# Patient Record
Sex: FEMALE | Race: WHITE
[De-identification: names, ages, dates, MRNs, and addresses within clinical notes are randomized per-mention and may not be internally consistent; named-entity substitution may affect disease eponyms.]

---
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
## 2019-04-30 ENCOUNTER — HOSPITAL ENCOUNTER (EMERGENCY)
Dept: HOSPITAL 62 - ER | Age: 19
Discharge: LEFT BEFORE DISCHARGE | End: 2019-04-30
Payer: OTHER GOVERNMENT

## 2019-04-30 VITALS — DIASTOLIC BLOOD PRESSURE: 63 MMHG | SYSTOLIC BLOOD PRESSURE: 105 MMHG

## 2019-04-30 DIAGNOSIS — Z53.21: Primary | ICD-10-CM

## 2019-07-28 ENCOUNTER — HOSPITAL ENCOUNTER (EMERGENCY)
Dept: HOSPITAL 62 - ER | Age: 19
Discharge: HOME | End: 2019-07-28
Payer: MEDICAID

## 2019-07-28 VITALS — SYSTOLIC BLOOD PRESSURE: 108 MMHG | DIASTOLIC BLOOD PRESSURE: 60 MMHG

## 2019-07-28 DIAGNOSIS — G89.29: ICD-10-CM

## 2019-07-28 DIAGNOSIS — R42: ICD-10-CM

## 2019-07-28 DIAGNOSIS — R07.9: ICD-10-CM

## 2019-07-28 DIAGNOSIS — O26.92: Primary | ICD-10-CM

## 2019-07-28 DIAGNOSIS — Z3A.18: ICD-10-CM

## 2019-07-28 DIAGNOSIS — R06.02: ICD-10-CM

## 2019-07-28 LAB
ADD MANUAL DIFF: NO
ALBUMIN SERPL-MCNC: 3.9 G/DL (ref 3.7–5.6)
ALP SERPL-CCNC: 56 U/L (ref 50–135)
ALT SERPL-CCNC: 14 U/L (ref 5–35)
ANION GAP SERPL CALC-SCNC: 9 MMOL/L (ref 5–19)
APPEARANCE UR: CLEAR
APTT BLD: 28.5 SEC (ref 23.5–35.8)
APTT PPP: YELLOW S
AST SERPL-CCNC: 17 U/L (ref 5–30)
BASOPHILS # BLD AUTO: 0 10^3/UL (ref 0–0.2)
BASOPHILS NFR BLD AUTO: 0.3 % (ref 0–2)
BILIRUB DIRECT SERPL-MCNC: 0.2 MG/DL (ref 0–0.4)
BILIRUB SERPL-MCNC: 0.3 MG/DL (ref 0.2–1.3)
BILIRUB UR QL STRIP: NEGATIVE
BUN SERPL-MCNC: 10 MG/DL (ref 7–20)
CALCIUM: 9.1 MG/DL (ref 8.4–10.2)
CHLORIDE SERPL-SCNC: 104 MMOL/L (ref 98–107)
CO2 SERPL-SCNC: 24 MMOL/L (ref 22–30)
D DIMER PPP FEU-MCNC: 0.73 UG/ML (ref 0–0.5)
EOSINOPHIL # BLD AUTO: 0.2 10^3/UL (ref 0–0.6)
EOSINOPHIL NFR BLD AUTO: 2.1 % (ref 0–6)
ERYTHROCYTE [DISTWIDTH] IN BLOOD BY AUTOMATED COUNT: 14.9 % (ref 11.5–14)
GLUCOSE SERPL-MCNC: 86 MG/DL (ref 75–110)
GLUCOSE UR STRIP-MCNC: NEGATIVE MG/DL
HCT VFR BLD CALC: 32.2 % (ref 36–47)
HGB BLD-MCNC: 10.8 G/DL (ref 12–15.5)
INR PPP: 1.02
KETONES UR STRIP-MCNC: NEGATIVE MG/DL
LYMPHOCYTES # BLD AUTO: 2.1 10^3/UL (ref 0.5–4.7)
LYMPHOCYTES NFR BLD AUTO: 18.1 % (ref 13–45)
MCH RBC QN AUTO: 27.4 PG (ref 27–33.4)
MCHC RBC AUTO-ENTMCNC: 33.4 G/DL (ref 32–36)
MCV RBC AUTO: 82 FL (ref 80–97)
MONOCYTES # BLD AUTO: 0.5 10^3/UL (ref 0.1–1.4)
MONOCYTES NFR BLD AUTO: 4.5 % (ref 3–13)
NEUTROPHILS # BLD AUTO: 8.8 10^3/UL (ref 1.7–8.2)
NEUTS SEG NFR BLD AUTO: 75 % (ref 42–78)
NITRITE UR QL STRIP: NEGATIVE
PH UR STRIP: 5 [PH] (ref 5–9)
PLATELET # BLD: 317 10^3/UL (ref 150–450)
POTASSIUM SERPL-SCNC: 4.5 MMOL/L (ref 3.6–5)
PROT SERPL-MCNC: 6.7 G/DL (ref 6.3–8.2)
PROT UR STRIP-MCNC: NEGATIVE MG/DL
PROTHROMBIN TIME: 13.4 SEC (ref 11.4–15.4)
RBC # BLD AUTO: 3.92 10^6/UL (ref 3.72–5.28)
SP GR UR STRIP: 1.03
TOTAL CELLS COUNTED % (AUTO): 100 %
UROBILINOGEN UR-MCNC: NEGATIVE MG/DL (ref ?–2)
WBC # BLD AUTO: 11.7 10^3/UL (ref 4–10.5)

## 2019-07-28 PROCEDURE — 85379 FIBRIN DEGRADATION QUANT: CPT

## 2019-07-28 PROCEDURE — 81001 URINALYSIS AUTO W/SCOPE: CPT

## 2019-07-28 PROCEDURE — 85610 PROTHROMBIN TIME: CPT

## 2019-07-28 PROCEDURE — 84702 CHORIONIC GONADOTROPIN TEST: CPT

## 2019-07-28 PROCEDURE — 99284 EMERGENCY DEPT VISIT MOD MDM: CPT

## 2019-07-28 PROCEDURE — 85730 THROMBOPLASTIN TIME PARTIAL: CPT

## 2019-07-28 PROCEDURE — 85025 COMPLETE CBC W/AUTO DIFF WBC: CPT

## 2019-07-28 PROCEDURE — 36415 COLL VENOUS BLD VENIPUNCTURE: CPT

## 2019-07-28 PROCEDURE — 80053 COMPREHEN METABOLIC PANEL: CPT

## 2019-07-28 NOTE — ER DOCUMENT REPORT
ED General





- General


Mode of Arrival: Ambulatory


Information source: Patient


TRAVEL OUTSIDE OF THE [place] IN LAST 30 DAYS: No





- HPI


Onset: Other - Shortness of breath 3 months and chest pain since her child was 

born 8 months ago


Onset/Duration: Intermittent


Quality of pain: Sharp


Severity: Mild


Pain Level: 1


Associated symptoms: Shortness of breath


Exacerbated by: Denies


Relieved by: Denies


Similar symptoms previously: Yes


Recently seen / treated by doctor: Yes





<JANUARY BUTLER - Last Filed: 19 21:11>





<YURY BLISS - Last Filed: 19 21:34>





- General


Chief Complaint: Dizziness


Stated Complaint: DIZZY, TROUBLE BREATHING


Time Seen by Provider: 19 16:37


Notes: 





18-year-old female presented to ED for complaint of dizziness and shortness of 

breath.  She is 18 weeks pregnant with her second child she does have an 

8-month-old child at home.  She states she has been feeling like this short of 

breath and pain in the center of her chest that comes and goes for about the 

last 2 to 3 months.  He states she has had this chest pain intermittently since 

her son was born; who is now 8 months old also.  She states she is been seen by 

her doctor multiple times and they have told her that everything is normal they 

have done blood work and everything is been fine.  She does have another 

appointment on Wednesday and she was just seen last Wednesday.  She does have a 

history of borderline personality disorder anxiety depression bipolar PTSD and 

anorexia.  Patient is alert oriented respirations regular and unlabored lungs 

are clear to auscultation.  I did speak with [facility] and they said she 

does have a follow-up appointment this coming Wednesday. (JANUARY BUTLER)





- Related Data


Allergies/Adverse Reactions: 


                                        





No Known Allergies Allergy (Verified 19 14:50)


   











Past Medical History





- General


Information source: Patient





- Social History


Smoking Status: Former Smoker - Vapor cigarette


Chew tobacco use (# tins/day): No


Frequency of alcohol use: None


Drug Abuse: None


Lives with: Family


Family History: Reviewed & Not Pertinent


Patient has suicidal ideation: No


Patient has homicidal ideation: No





- Past Medical History


Cardiac Medical History: Reports: None


Pulmonary Medical History: Reports: None


EENT Medical History: Reports: None


Neurological Medical History: Reports: None


Endocrine Medical History: Reports: None


Renal/ Medical History: Reports: None


Malignancy Medical History: Reports: None


GI Medical History: Reports: None


Musculoskeletal Medical History: Reports Hx Musculoskeletal Trauma


Skin Medical History: Reports None


Psychiatric Medical History: Reports: Hx Anxiety, Hx Bipolar Disorder, Hx 

Borderline Personality Disorder, Hx Depression, Hx Post Traumatic Stress 

Disorder, Other - Anorexia


Traumatic Medical History: Reports: Hx Fractures - Left clavicle right foot tib-

fib and ankle


Infectious Medical History: Reports: None


Past Surgical History: Reports: Hx Orthopedic Surgery - Right foot and ankle





- Immunizations


Immunizations up to date: Yes





<JANUARY BUTLER - Last Filed: 19 21:11>





Review of Systems





- Review of Systems


Constitutional: No symptoms reported


EENT: No symptoms reported


Cardiovascular: Chest pain - Chest pain intermittently no chest pain at this 

time, Dizziness


Respiratory: Short of breath


Gastrointestinal: No symptoms reported


Genitourinary: No symptoms reported


Female Genitourinary: Pregnant, Other - Intermittent round ligament pain


Musculoskeletal: No symptoms reported


Skin: No symptoms reported


Hematologic/Lymphatic: No symptoms reported


Neurological/Psychological: No symptoms reported


-: Yes All other systems reviewed and negative





<JANUARY BUTLER - Last Filed: 19 21:11>





Physical Exam





- Vital signs


Interpretation: Normal





- General


General appearance: Appears well, Alert





- HEENT


Head: Normocephalic, Atraumatic


Eyes: Normal


Pupils: PERRL





- Respiratory


Respiratory status: No respiratory distress.  No: Respiratory distress


Chest status: Nontender.  No: Tender


Breath sounds: Normal.  No: Decreased air movement, Nonproductive cough, 

Productive cough


Chest palpation: Normal





- Cardiovascular


Rhythm: Regular


Heart sounds: Normal auscultation


Murmur: No





- Abdominal


Inspection: Normal, Gravid female - 18 weeks pregnant fetal heart turn 145


Distension: No distension


Bowel sounds: Normal


Tenderness: Nontender


Organomegaly: No organomegaly





- Back


Back: Normal, Nontender





- Extremities


General upper extremity: Normal inspection, Nontender, Normal color, Normal ROM,

Normal temperature


General lower extremity: Normal inspection, Nontender, Normal color, Normal ROM,

Normal temperature, Normal weight bearing.  No: Courtney's sign





- Neurological


Neuro grossly intact: Yes


Cognition: Normal


Orientation: AAOx4


Tanisha Coma Scale Eye Opening: Spontaneous


Mccleary Coma Scale Verbal: Oriented


Mccleary Coma Scale Motor: Obeys Commands


Mccleary Coma Scale Total: 15


Speech: Normal


Motor strength normal: LUE, RUE, LLE, RLE


Sensory: Normal





- Psychological


Associated symptoms: Normal affect, Normal mood





- Skin


Skin Temperature: Warm


Skin Moisture: Dry


Skin Color: Normal





<JANUARY BUTLER - Last Filed: 19 21:11>





- Vital signs


Vitals: 





                                        











Temp Pulse Resp BP Pulse Ox


 


 98.1 F   102 H  18   102/63   98 


 


 19 15:00  19 15:00  19 15:00  19 15:00  19 15:00














Course





- Laboratory


Result Diagrams: 


                                 19 17:00





                                 19 17:00





<JANUARY BUTLER - Last Filed: 19 21:11>





- Laboratory


Result Diagrams: 


                                 19 17:00





                                 19 17:00





<YURY BLISS - Last Filed: 19 21:34>





- Re-evaluation


Re-evalutation: 





19 20:52


Consulted Dr. Bliss concerning the positive d-dimer and a 18-week pregnant girl. 

She has a history of mental illnesses with shortness of breath and chest pain 

during her pregnancy last time and this time.  She has been to the OB/GYN 

multiple times for the same symptoms.  She was just concerned because she was 

going to be flying across the country.  Dr. Bliss did come in and examined the 

patient and stated that the patient could go home at this time.  He did an 

ultrasound at the bedside.  I am sending the patient home with her lab values to

follow-up with her OB/GYN.  I did speak with [facility] and they states she

does have an appointment on Wednesday.  Spoke with a Dr. Morris. 

(JANUARY BUTLER)





19 21:29


I personally and independently obtained patient history and examined the patient

in conjunction with the APC and agree with the assessment, treatment plan and 

disposition of the patient as recorded by the APC, and have reviewed the APC's 

note.





HISTORY OF PRESENT ILLNESS:


Patient is a 18-year-old female,  that presents to the emergency department

for chief complaint of shortness of breath and palpitations.  Patient reports 

she really only gets these symptoms when she stands up, she looked up online 

that may be she had pots, she did see her OB/GYN for this, and discussed that 

with them, initially thought it was due to anxiety, but she wanted to be checked

out further, she was referred to a cardiologist, but has not seen them yet.  She

denies having any chest pain at this time although she reports she is had chest 

pain before, but does not associated with the symptoms of shortness of breath 

and palpitations when she stands up.  She states that it only lasts for a moment

or 2 after standing up.  She denies any swelling in her legs, or calf pain or 

tenderness.  She is most concerned because she is recently set the travel and 

wants to have everything checked out.





ROS:


Constitutional: Negative for fever.


Cardiovascular: Positive for palpitations.


Respiratory: Positive for shortness of breath


Gastrointestinal: Negative for vomiting or abdominal pain 


Musculoskeletal: Negative for arm, leg or back pain


Skin: Negative for rash.


Neurological: Negative for weakness or numbness.





Other than noted above, the 12 point review of systems was reviewed with the 

patient and were negative, all pertinent findings are included in the HPI.








PHYSICAL EXAMINATION:





Vital signs reviewed, nursing noted reviewed. 





GENERAL: Well-appearing, well-nourished and in no acute distress.





HEAD: Atraumatic, normocephalic.





EYES: Eyes appear normal, conjunctiva are normal.





ENT: nares patent, oropharynx clear without exudates.  Moist mucous membranes.





NECK: Normal range of motion, supple without lymphadenopathy





LUNGS: Breath sounds clear to auscultation bilaterally and equal.  No wheezes 

rales or rhonchi.





HEART: Regular rate and rhythm without murmurs





ABDOMEN: Soft, nontender, normoactive bowel sounds.  No rebound, guarding, or 

rigidity. No masses appreciated.





EXTREMITIES: Nontender, good range of motion, no pitting or edema.  





NEUROLOGICAL: No focal neurological deficits. Moves all extremities 

spontaneously Motor and sensory grossly intact on exam.





PSYCH: Normal mood, normal affect.





SKIN: Warm, Dry, normal turgor, no rashes or lesions noted on exposed skin





MEDICAL DECISION MAKING:





Patient appeared well on exam, no acute distress, I was consulted, due to an 

elevated d-dimer, that was ordered in triage, I did not think that this test was

necessary in the first place unfortunately was positive, however it typically is

positive and pregnant patients, patient's clinical symptoms and history are not 

consistent with PE, and she certainly does not have symptoms of DVT, she seems 

to have orthostasis, causing lightheadedness, palpitations and shortness of 

breath, that is improved with sitting down and relaxing.  I did perform bedside 

echocardiogram, demonstrated a completely normal bedside echo, she had an EF of 

estimated 65%, normal sized right ventricle, concentric and normal LV function, 

no pericardial effusion, and no valvular dysfunction noted with color Doppler.





At this point I feel the patient can be discharged home, I have very low 

suspicion that the patient has any acute cardiopulmonary abnormalities leading 

to her symptoms, I feel she needs to follow-up with her OB/GYN, for any further 

issues, I discussed with her, that if she develops pleuritic pain, or unilateral

leg swelling or any symptoms concerning for DVT or PE that she needs to return 

to the emergency department, patient was agreeable with this plan of care and wa

s discharged home.





Please review detail APC documentation. 





*Note is created using voice recognition software and may contain spelling, 

syntax or grammatical errors.  


 (YURY BLISS)





- Vital Signs


Vital signs: 





                                        











Temp Pulse Resp BP Pulse Ox


 


 98.6 F   64   18   108/60   99 


 


 19 20:54  19 20:54  19 20:54  19 20:54  19 20:54














- Laboratory


Laboratory results interpreted by me: 





                                        











  19





  17:00 17:00 17:00


 


WBC  11.7 H  


 


Hgb  10.8 L  


 


Hct  32.2 L  


 


RDW  14.9 H  


 


Absolute Neutrophils  8.8 H  


 


D-Dimer    0.73 H


 


Sodium   136.9 L 


 


Beta HCG, Quant   83060.00 H 














Discharge





<JANUARY BUTLER - Last Filed: 19 21:11>





<YURY BLISS - Last Filed: 19 21:34>





- Discharge


Clinical Impression: 


 Shortness of breath due to pregnancy in second trimester, Chronic chest pain 

during pregnancy





Condition: Stable


Disposition: HOME, SELF-CARE


Additional Instructions: 


You were seen today for your chronic chest pain, dizziness, and shortness of 

breath during your pregnancy.  You state you have had the same pain in your 

chest intermittently since the birth of your son who is now 8 months old.  You 

state you have been seen at [facility] and have had work-ups and

were told you were normal.  You are taking your hydroxyzine as you have been 

instructed.  Please continue medications as were prescribed by your OB/GYN.








Dr. Bliss did see you in the emergency room and did a ultrasound at the bedside. 

As he stated you need to follow-up with OB/GYN on Wednesday as a scheduled.  

Please take your lab results with you to your visit at OB/GYN.








As we discussed please research vapor cigarettes and popcorn long before you 

continue smoking your vapor cigarette.





Acetaminophen





     Acetaminophen may be taken for pain relief or fever control. It's much 

safer than aspirin, offering a wider range of "safe" dosages.  It is safe during

pregnancy.  Some brand names are Tylenol, Panadol, Datril, Anacin 3, Tempra, and

Liquiprin. Acetaminophen can be repeated every four hours.  The following are 

maximum recommended dosages:





WEIGHT         Dose             Drops                  Elixir        

Chewable(80mg)


(LBS.)                            drprs=droppers    tsp=teaspoon


6                 40 mg            .4 ml (1/2)


6-11            80 mg            .8 ml (full)            1/2   tsp           1  

    tab


12-16         120 mg           1 1/2 drprs            3/4   tsp           1 1/2 

tabs


17-23         160 mg             2  drprs              1      tsp           2   

   tabs


24-30         240 mg             3  drprs              1 1/2 tsp           3    

  tabs


30-35         320 mg                                     2       tsp           4

      tabs


36-41         360 mg                                     2 1/4 tsp           4 

1/2  tabs


42-47         400 mg                                     2 1/2 tsp           5  

    tabs


48-53         480 mg                                     3       tsp          6 

     tabs


54-59         520 mg                                     3  1/4 tsp          6 

1/2 tabs


60-64         560 mg                                     3  1/2 tsp          7  

   tabs 


65-70         600 mg                                     3  3/4 tsp          7 

1/2 tabs


71-76         640 mg                                     4       tsp           8

     tabs


77-82         720 mg                                     4 1/2  tsp           9 

    tabs


83-88         800 mg                                     5       tsp           

10     tabs





>89 pounds or adults          650 mg to 900 mg 





Acetaminophen can be repeated every four hours. Maximum daily dose not to exceed

4000 mg.





   These maximum recommended dosages are slightly higher than the dosages 

written on the product container, but these dosages are very safe and well below

the toxic dosage for acetaminophen.








FOLLOW-UP CARE:


If you have been referred to a physician for follow-up care, call the 

physicians office for an appointment as you were instructed or within the next 

two days.  If you experience worsening or a significant change in your symptoms,

notify the physician immediately or return to the Emergency Department at any 

time for re-evaluation.

## 2019-07-28 NOTE — ER DOCUMENT REPORT
ED Medical Screen (RME)





- General


Chief Complaint: Dizziness


Stated Complaint: DIZZY, TROUBLE BREATHING


Time Seen by Provider: 07/28/19 16:37


Primary Care Provider: 


KWABENA MCDONALD MD [Primary Care Provider] - Follow up as needed


Notes: 





Patient is a G2, P1 18-year-old female, 18 weeks pregnant who presents the 

emergency department with a chief complaint of dizziness and shortness of 

breath.  She states that she feels like she has shortness of breath in the 

middle of her chest and states that she has a little bit of pain.  Patient 

states that she has been feeling dizzy for a while, ever since she had her 

daughter who is 8 months old.  She has been seen by [facility] and has had work-ups and was told that everything was normal. 

She was prescribed hydroxyzine, but she has not taken her hydroxyzine.  Past 

medical history includes bipolar disorder, PTSD, and depression.





Exam: Visibly pregnant, and about 18 weeks pregnant.





I have greeted and performed a rapid initial assessment of this patient.  A 

comprehensive ED assessment and evaluation of the patient, analysis of test 

results and completion of medical decision making process will be conducted by 

an additional ED providers.


TRAVEL OUTSIDE OF THE [place] IN LAST 30 DAYS: No





- Related Data


Allergies/Adverse Reactions: 


                                        





No Known Allergies Allergy (Verified 07/28/19 14:50)


   











Past Medical History





- Social History


Chew tobacco use (# tins/day): No


Frequency of alcohol use: None


Drug Abuse: None


Renal/ Medical History: Denies: Hx Peritoneal Dialysis


Musculoskeltal Medical History: Reports Hx Musculoskeletal Trauma


Traumatic Medical History: Reports: Hx Fractures - Left clavicle right foot and 

ankle


Past Surgical History: Reports: Hx Orthopedic Surgery - Right foot and ankle





- Immunizations


Immunizations up to date: Yes





Physical Exam





- Vital signs


Vitals: 





                                        











Temp Pulse Resp BP Pulse Ox


 


 98.1 F   102 H  18   102/63   98 


 


 07/28/19 15:00  07/28/19 15:00  07/28/19 15:00 07/28/19 15:00 07/28/19 15:00














Course





- Vital Signs


Vital signs: 





                                        











Temp Pulse Resp BP Pulse Ox


 


 98.1 F   102 H  18   102/63   98 


 


 07/28/19 15:00  07/28/19 15:00  07/28/19 15:00  07/28/19 15:00  07/28/19 15:00














Doctor's Discharge





- Discharge


Referrals: 


KWABENA MCDONALD MD [Primary Care Provider] - Follow up as needed